# Patient Record
Sex: FEMALE | Race: WHITE
[De-identification: names, ages, dates, MRNs, and addresses within clinical notes are randomized per-mention and may not be internally consistent; named-entity substitution may affect disease eponyms.]

---

## 2021-06-09 ENCOUNTER — HOSPITAL ENCOUNTER (EMERGENCY)
Dept: HOSPITAL 96 - M.ERS | Age: 56
Discharge: LEFT BEFORE DISCHARGE | End: 2021-06-09
Payer: COMMERCIAL

## 2021-06-09 VITALS — BODY MASS INDEX: 22.5 KG/M2 | WEIGHT: 139.99 LBS | HEIGHT: 66 IN

## 2021-06-09 VITALS — SYSTOLIC BLOOD PRESSURE: 132 MMHG | DIASTOLIC BLOOD PRESSURE: 78 MMHG

## 2021-06-09 DIAGNOSIS — Z79.899: ICD-10-CM

## 2021-06-09 DIAGNOSIS — M79.662: ICD-10-CM

## 2021-06-09 DIAGNOSIS — U07.1: Primary | ICD-10-CM

## 2021-06-09 NOTE — EKG
[place] [address]
Phone:  [phone]                     ELECTROCARDIOGRAM REPORT      
_______________________________________________________________________________
 
Name:         RUDY LAMAR                Room:                     [facility]#:    [id number]     Account #:     [id number]  
Admission:    21    Attend Phys:                     
Discharge:    21    Date of Birth: [birth year]  
Date of Service: 21 0032  Report #:      [id number]
        [id number]
_______________________________________________________________________________
THIS REPORT FOR:  //name//                      
 
                         [facility] ED
                                       
Test Date:    2021               Test Time:    00:32:37
Pat Name:     RUDY LAMAR             Department:   
Patient ID:   [id number]            Room:          
Gender:                               Technician:   SAMSON
:          1965               Requested By: Melvina Babin
Order Number: [id number] MD:   Cuate Frye
                                 Measurements
Intervals                              Axis          
Rate:         98                       P:            18
MN:           139                      QRS:          34
QRSD:         83                       T:            8
QT:           305                                    
QTc:          390                                    
                           Interpretive Statements
Sinus rhythm
Probable left atrial enlargement
No previous ECG available for comparison
Electronically Signed On 2021 14:14:49 CDT by Cuate Frye
[email or web address]
 
 
 
 
 
 
 
 
 
 
 
 
 
 
 
 
 
 
 
 
 
  <ELECTRONICALLY SIGNED>
                                           By: Cuate Frye MD, [facility]     
  21     1414
D: 21   _____________________________________
T: 21   Cuate Frye MD, [facility]       /EPI

## 2021-06-13 ENCOUNTER — HOSPITAL ENCOUNTER (INPATIENT)
Dept: HOSPITAL 96 - M.ERS | Age: 56
LOS: 5 days | Discharge: LEFT BEFORE DISCHARGE | DRG: 871 | End: 2021-06-18
Attending: FAMILY MEDICINE | Admitting: FAMILY MEDICINE
Payer: COMMERCIAL

## 2021-06-13 VITALS — BODY MASS INDEX: 25.43 KG/M2 | WEIGHT: 158.25 LBS | HEIGHT: 65.98 IN

## 2021-06-13 VITALS — SYSTOLIC BLOOD PRESSURE: 108 MMHG | DIASTOLIC BLOOD PRESSURE: 70 MMHG

## 2021-06-13 DIAGNOSIS — Z91.19: ICD-10-CM

## 2021-06-13 DIAGNOSIS — B95.62: ICD-10-CM

## 2021-06-13 DIAGNOSIS — U07.1: ICD-10-CM

## 2021-06-13 DIAGNOSIS — J80: ICD-10-CM

## 2021-06-13 DIAGNOSIS — E87.1: ICD-10-CM

## 2021-06-13 DIAGNOSIS — Z53.29: ICD-10-CM

## 2021-06-13 DIAGNOSIS — J12.82: ICD-10-CM

## 2021-06-13 DIAGNOSIS — A41.89: Primary | ICD-10-CM

## 2021-06-13 DIAGNOSIS — Z79.899: ICD-10-CM

## 2021-06-13 LAB
ABSOLUTE MONOCYTES: 0.2 THOU/UL (ref 0–1.2)
ALBUMIN SERPL-MCNC: 2.7 G/DL (ref 3.4–5)
ALP SERPL-CCNC: 98 U/L (ref 46–116)
ALT SERPL-CCNC: 56 U/L (ref 30–65)
ANION GAP SERPL CALC-SCNC: 6 MMOL/L (ref 7–16)
AST SERPL-CCNC: 59 U/L (ref 15–37)
BILIRUB SERPL-MCNC: 0.2 MG/DL
BUN SERPL-MCNC: 14 MG/DL (ref 7–18)
CALCIUM SERPL-MCNC: 8.6 MG/DL (ref 8.5–10.1)
CHLORIDE SERPL-SCNC: 96 MMOL/L (ref 98–107)
CO2 SERPL-SCNC: 30 MMOL/L (ref 21–32)
CREAT SERPL-MCNC: 0.9 MG/DL (ref 0.6–1.3)
GLUCOSE SERPL-MCNC: 189 MG/DL (ref 70–99)
GRANULOCYTES NFR BLD MANUAL: 89 %
HCT VFR BLD CALC: 39.4 % (ref 37–47)
HGB BLD-MCNC: 13.6 GM/DL (ref 12–15)
INR PPP: 1
LYMPHOCYTES # BLD: 0.3 THOU/UL (ref 0.8–5.3)
LYMPHOCYTES NFR BLD AUTO: 7 %
MAGNESIUM SERPL-MCNC: 2.2 MG/DL (ref 1.8–2.4)
MCH RBC QN AUTO: 31.2 PG (ref 26–34)
MCHC RBC AUTO-ENTMCNC: 34.6 G/DL (ref 28–37)
MCV RBC: 90.2 FL (ref 80–100)
MONOCYTES NFR BLD: 4 %
MPV: 10.1 FL. (ref 7.2–11.1)
NEUTROPHILS # BLD: 3.8 THOU/UL (ref 1.6–8.1)
NT-PRO BRAIN NAT PEPTIDE: 64 PG/ML (ref ?–300)
NUCLEATED RBCS: 0 /100WBC
PLATELET # BLD EST: ADEQUATE 10*3/UL
PLATELET COUNT*: 205 THOU/UL (ref 150–400)
POTASSIUM SERPL-SCNC: 3.8 MMOL/L (ref 3.5–5.1)
PROT SERPL-MCNC: 7.4 G/DL (ref 6.4–8.2)
PROTHROMBIN TIME: 10.8 SECONDS (ref 9.2–11.5)
RBC # BLD AUTO: 4.36 MIL/UL (ref 4.2–5)
RBC MORPH BLD: NORMAL
RDW-CV: 12.9 % (ref 10.5–14.5)
SODIUM SERPL-SCNC: 132 MMOL/L (ref 136–145)
WBC # BLD AUTO: 4.3 THOU/UL (ref 4–11)

## 2021-06-14 VITALS — SYSTOLIC BLOOD PRESSURE: 120 MMHG | DIASTOLIC BLOOD PRESSURE: 77 MMHG

## 2021-06-14 VITALS — DIASTOLIC BLOOD PRESSURE: 64 MMHG | SYSTOLIC BLOOD PRESSURE: 113 MMHG

## 2021-06-14 VITALS — SYSTOLIC BLOOD PRESSURE: 102 MMHG | DIASTOLIC BLOOD PRESSURE: 63 MMHG

## 2021-06-14 VITALS — SYSTOLIC BLOOD PRESSURE: 110 MMHG | DIASTOLIC BLOOD PRESSURE: 70 MMHG

## 2021-06-14 VITALS — DIASTOLIC BLOOD PRESSURE: 72 MMHG | SYSTOLIC BLOOD PRESSURE: 111 MMHG

## 2021-06-14 VITALS — SYSTOLIC BLOOD PRESSURE: 108 MMHG | DIASTOLIC BLOOD PRESSURE: 67 MMHG

## 2021-06-14 VITALS — SYSTOLIC BLOOD PRESSURE: 124 MMHG | DIASTOLIC BLOOD PRESSURE: 60 MMHG

## 2021-06-14 LAB
ANION GAP SERPL CALC-SCNC: 6 MMOL/L (ref 7–16)
BE: 4.7 MMOL/L
BILIRUB UR-MCNC: NEGATIVE MG/DL
BUN SERPL-MCNC: 16 MG/DL (ref 7–18)
CALCIUM SERPL-MCNC: 8.7 MG/DL (ref 8.5–10.1)
CHLORIDE SERPL-SCNC: 98 MMOL/L (ref 98–107)
CO2 SERPL-SCNC: 30 MMOL/L (ref 21–32)
COLOR UR: YELLOW
CREAT SERPL-MCNC: 0.6 MG/DL (ref 0.6–1.3)
GLUCOSE SERPL-MCNC: 181 MG/DL (ref 70–99)
HCT VFR BLD CALC: 40.7 % (ref 37–47)
HGB BLD-MCNC: 13.8 GM/DL (ref 12–15)
KETONES UR STRIP-MCNC: NEGATIVE MG/DL
MCH RBC QN AUTO: 30.9 PG (ref 26–34)
MCHC RBC AUTO-ENTMCNC: 33.9 G/DL (ref 28–37)
MCV RBC: 91.1 FL (ref 80–100)
MPV: 9.7 FL. (ref 7.2–11.1)
PCO2 BLD: 36.3 MMHG (ref 35–45)
PLATELET COUNT*: 207 THOU/UL (ref 150–400)
PO2 BLD: 95.5 MMHG (ref 75–100)
POTASSIUM SERPL-SCNC: 4.7 MMOL/L (ref 3.5–5.1)
PROT UR QL STRIP: NEGATIVE
RBC # BLD AUTO: 4.47 MIL/UL (ref 4.2–5)
RBC # UR STRIP: NEGATIVE /UL
RDW-CV: 13.1 % (ref 10.5–14.5)
SODIUM SERPL-SCNC: 134 MMOL/L (ref 136–145)
SP GR UR STRIP: 1.01 (ref 1–1.03)
URINE CLARITY: CLEAR
URINE GLUCOSE-RANDOM: (no result)
URINE LEUKOCYTES-REFLEX: NEGATIVE
URINE NITRITE-REFLEX: NEGATIVE
UROBILINOGEN UR STRIP-ACNC: 0.2 E.U./DL (ref 0.2–1)
WBC # BLD AUTO: 4.5 THOU/UL (ref 4–11)

## 2021-06-14 PROCEDURE — XW033E5 INTRODUCTION OF REMDESIVIR ANTI-INFECTIVE INTO PERIPHERAL VEIN, PERCUTANEOUS APPROACH, NEW TECHNOLOGY GROUP 5: ICD-10-PCS | Performed by: FAMILY MEDICINE

## 2021-06-14 PROCEDURE — 5A0945A ASSISTANCE WITH RESPIRATORY VENTILATION, 24-96 CONSECUTIVE HOURS, HIGH NASAL FLOW/VELOCITY: ICD-10-PCS | Performed by: FAMILY MEDICINE

## 2021-06-14 NOTE — EKG
[place] [address]
Phone:  [phone]                     ELECTROCARDIOGRAM REPORT      
_______________________________________________________________________________
 
Name:         RUDY LAMAR                  Room:          [place] [address]    ADM [place] 
.R.#:    [id number]     Account #:     [id number]  
Admission:    21    Attend Phys:   Kristy Powers MD 
Discharge:                Date of Birth: [birth year]  
Date of Service: 21  Report #:      [id number]
        [id number]
_______________________________________________________________________________
THIS REPORT FOR:  //name//                      
 
                         [facility] ED
                                       
Test Date:    2021               Test Time:    19:53:33
Pat Name:     RUDY LAMAR             Department:   
Patient ID:   [id number]            Room:         [facility]
Gender:       F                        Technician:   STUDENT
:          1965               Requested By: Sherin Morgan
Order Number: [id number] MD:   Griffin Maher
                                 Measurements
Intervals                              Axis          
Rate:         84                       P:            43
MT:           139                      QRS:          43
QRSD:         84                       T:            42
QT:           336                                    
QTc:          398                                    
                           Interpretive Statements
Sinus rhythm
LAE, consider biatrial enlargement
Compared to ECG 2021 00:32:37
No significant changes
Electronically Signed On 2021 11:34:22 CDT by Griffin Maher
[email or web address]
 
 
 
 
 
 
 
 
 
 
 
 
 
 
 
 
 
 
 
 
  <ELECTRONICALLY SIGNED>
                                           By: Griffin Maher MD, [facility]      
  21     1134
D: 21   _____________________________________
T: 21   Griffin Maher MD, [facility]        /EPI

## 2021-06-15 VITALS — SYSTOLIC BLOOD PRESSURE: 112 MMHG | DIASTOLIC BLOOD PRESSURE: 61 MMHG

## 2021-06-15 VITALS — SYSTOLIC BLOOD PRESSURE: 114 MMHG | DIASTOLIC BLOOD PRESSURE: 70 MMHG

## 2021-06-15 VITALS — SYSTOLIC BLOOD PRESSURE: 122 MMHG | DIASTOLIC BLOOD PRESSURE: 74 MMHG

## 2021-06-15 VITALS — SYSTOLIC BLOOD PRESSURE: 126 MMHG | DIASTOLIC BLOOD PRESSURE: 63 MMHG

## 2021-06-15 VITALS — SYSTOLIC BLOOD PRESSURE: 107 MMHG | DIASTOLIC BLOOD PRESSURE: 68 MMHG

## 2021-06-15 LAB
ABSOLUTE BASOPHILS: 0 THOU/UL (ref 0–0.2)
ABSOLUTE EOSINOPHILS: 0 THOU/UL (ref 0–0.7)
ABSOLUTE MONOCYTES: 0.3 THOU/UL (ref 0–1.2)
ANION GAP SERPL CALC-SCNC: 8 MMOL/L (ref 7–16)
BASOPHILS NFR BLD AUTO: 0.2 %
BUN SERPL-MCNC: 21 MG/DL (ref 7–18)
CALCIUM SERPL-MCNC: 8.9 MG/DL (ref 8.5–10.1)
CHLORIDE SERPL-SCNC: 102 MMOL/L (ref 98–107)
CO2 SERPL-SCNC: 28 MMOL/L (ref 21–32)
CREAT SERPL-MCNC: 0.8 MG/DL (ref 0.6–1.3)
EOSINOPHIL NFR BLD: 0 %
GLUCOSE SERPL-MCNC: 156 MG/DL (ref 70–99)
GRANULOCYTES NFR BLD MANUAL: 84.2 %
HCT VFR BLD CALC: 39.9 % (ref 37–47)
HGB BLD-MCNC: 13.6 GM/DL (ref 12–15)
LYMPHOCYTES # BLD: 0.3 THOU/UL (ref 0.8–5.3)
LYMPHOCYTES NFR BLD AUTO: 6.9 %
MCH RBC QN AUTO: 31 PG (ref 26–34)
MCHC RBC AUTO-ENTMCNC: 34 G/DL (ref 28–37)
MCV RBC: 91.2 FL (ref 80–100)
MONOCYTES NFR BLD: 8.7 %
MPV: 9.8 FL. (ref 7.2–11.1)
NEUTROPHILS # BLD: 3.3 THOU/UL (ref 1.6–8.1)
NUCLEATED RBCS: 0 /100WBC
PLATELET COUNT*: 242 THOU/UL (ref 150–400)
POTASSIUM SERPL-SCNC: 3.6 MMOL/L (ref 3.5–5.1)
RBC # BLD AUTO: 4.38 MIL/UL (ref 4.2–5)
RDW-CV: 13 % (ref 10.5–14.5)
SODIUM SERPL-SCNC: 138 MMOL/L (ref 136–145)
WBC # BLD AUTO: 4 THOU/UL (ref 4–11)

## 2021-06-15 PROCEDURE — 02HV33Z INSERTION OF INFUSION DEVICE INTO SUPERIOR VENA CAVA, PERCUTANEOUS APPROACH: ICD-10-PCS | Performed by: FAMILY MEDICINE

## 2021-06-15 NOTE — 2DMMODE
[place] [address]
Phone:  [phone] 2 D/M-MODE ECHOCARDIOGRAM     
_______________________________________________________________________________
 
Name:         RUDY LAMAR                Room:          [place] [address]    ADM IN 
YANN.R.#:    [id number]     Account #:     [id number]  
Admission:    21    Attend Phys:   Kristy Powers MD 
Discharge:                Date of Birth: [birth year]  
Date of Service: 06/15/21 0819  Report #:      [id number]
        [id number]
_______________________________________________________________________________
THIS REPORT FOR:
 
cc:  FAM - No family physician/PCP 
     FAM - No family physician/PCP 
     Griffin Maher MD [facility]        
                                                                       ~
 
--------------- APPROVED REPORT --------------
 
 
Study performed:  2021 15:49:36
 
EXAM: Comprehensive 2D, Doppler, and color-flow 
Echocardiogram 
Patient Location: In-Patient   
Room #:  [facility]     Status:  routine
 
      BSA:         1.81
HR: 79 bpm BP:          124/60 mmHg 
Rhythm: NSR     
 
Other Information 
Study Quality: Good
 
Indications
Congestive Heart Failure
 
2D Dimensions
IVSd:  8.25 (7-11mm) LVOT Diam:  19.23 (18-24mm) 
LVDd:  42.09 mm  
PWd:  8.96 (7-11mm) Ascending Ao:  30.78 (22-36mm)
LVDs:  28.29 (25-40mm) 
Aortic Root:  28.98 mm 
 
Volumes
Left Atrial Volume (Systole) 
    LA ESV Index:  16.70 mL/m2
 
Aortic Valve
AoV Peak Emory.:  1.31 m/s 
AO Peak Gr.:  6.84 mmHg  LVOT Max P.10 mmHg
AO Mean Gr.:  3.67 mmHg  LVOT Mean PG:  3.47 mmHg
    LVOT Max V:  1.33 m/s
AO V2 VTI:  24.00 cm  LVOT Mean V:  0.86 m/s
CEM (VTI):  2.93 cm2  LVOT V1 VTI:  24.26 cm
 
 
 
[place] [address]
Phone:  [phone]                     2 D/M-MODE ECHOCARDIOGRAM     
_______________________________________________________________________________
 
Name:         RUDY LAMAR                Room:          [address] [place] 
.R.#:    [id number]     Account #:     [id number]  
Admission:    21    Attend Phys:   Kristy Powers MD 
Discharge:                Date of Birth: [birth year]  
Date of Service: 06/15/21 0819  Report #:      [id number]
        [id number]
_______________________________________________________________________________
Mitral Valve
    E/A Ratio:  1.13
    MV Decel. Time:  229.99 ms
MV E Max Emory.:  0.77 m/s 
MV PHT:  66.70 ms  
MVA (PHT):  3.30 cm2  
 
TDI
E/Lateral E':  7.00 E/Medial E':  7.70
   Medial E' Emory.:  0.10 m/s
   Lateral E' Emory.:  0.11 m/s
 
Pulmonary Valve
PV Peak Emory.:  0.92 m/s PV Peak Gr.:  3.36 mmHg
 
Tricuspid Valve
    RAP Estimate:  5.00 mmHg
TR Peak Gr.:  25.60 mmHg RVSP:  30.00 mmHg
    PA Pressure:  30.00 mmHg
 
Left Ventricle
The left ventricle is normal size. There is normal LV segmental wall 
motion. There is normal left ventricular wall thickness. Left 
ventricular systolic function is normal. The left ventricular 
ejection fraction is within the normal range. LVEF is 60-65%. The 
left ventricular diastolic function is normal.
 
Right Ventricle
The right ventricle is normal size. The right ventricular systolic 
function is normal.
 
Atria
The left atrium size is normal. The right atrium size is 
normal.
 
Aortic Valve
The aortic valve is normal in structure. No aortic regurgitation is 
present. There is no aortic valvular stenosis.
 
Mitral Valve
The mitral valve is normal in structure. There is trace mitral valve 
regurgitation noted. No evidence of mitral valve stenosis.
 
Tricuspid Valve
The tricuspid valve is normal in structure. Mild tricuspid 
regurgitation. estimated pa pressure 35 mm Hg
 
 
[place] [address]
Phone:  [phone]                     2 D/M-MODE ECHOCARDIOGRAM     
_______________________________________________________________________________
 
Name:         RUDY LAMAR                Room:          [address] [place] 
.R.#:    [id number]     Account #:     [id number]  
Admission:    21    Attend Phys:   Kristy Powers MD 
Discharge:                Date of Birth: [birth year]  
Date of Service: 06/15/21 0819  Report #:      [id number]
        [id number]
_______________________________________________________________________________
 
Pulmonic Valve
The pulmonary valve is normal in structure. There is no pulmonic 
valvular regurgitation.
 
Great Vessels
The aortic root is normal in size. IVC is normal in size and 
collapses >50% with inspiration.
 
Pericardium
There is no pericardial effusion.
 
<Conclusion>
LVEF is 60-65%.
There is trace mitral valve regurgitation noted.
Mild tricuspid regurgitation.
estimated pa pressure 35 mm Hg
 
 
 
 
 
 
 
 
 
 
 
 
 
 
 
 
 
 
 
 
 
 
 
 
 
 
 
  <ELECTRONICALLY SIGNED>
                                           By: Griffin Maher MD, [facility]      
  06/15/21     0819
D: 06/15/21 0819   _____________________________________
T: 06/15/21 0819   Griffin Maher MD, [facility]        /INF

## 2021-06-15 NOTE — CON
[address]  [address]                    CONSULTATION                  
_______________________________________________________________________________
 
Name:       RUDY LAMAR                 Room:           [place] [address]    ADM [place]  
M.R.#:  [id number]      Account #:      [id number]  
Admission:  06/13/21     Attend Phys:    Kristy Powers MD    
Discharge:               Date of Birth:  [birth year]  
         Report #: [id number]
                                                                     [id number]
_______________________________________________________________________________
THIS REPORT FOR:  
 
cc:  FAM - No family physician/PCP 
     FAM - No family physician/PCP                                        
     Tommy Byrne MD                                                   ~
 
 
DOC #: [id number]
Tommy Byrne MD
DATE OF CONSULTATION: 06/14/2021
 
REQUESTING PHYSICIAN:  Brice Cervantes MD
 
INDICATION FOR CONSULTATION:  Acute hypoxemic respiratory failure secondary to 
COVID-19.
 
HISTORY OF PRESENT ILLNESS:  This is a 56-year-old female, past medical history 
includes a history of endocarditis, shortly after a childbirth.  The patient 
says that she had had some dental work done prior to this and is reported to 
have had a damaged heart valve, do not have previous records available.  Other 
than this, she does not have any other past medical history of a cardiac or 
respiratory disease.  She is a lifetime nonsmoker.
 
She came to the Emergency Room a few days ago, was tested positive for COVID-19 
and had left against medical advice.  Now came back here with worsening 
shortness of breath and is reported to be in respiratory distress on initial 
arrival.  The patient currently is on a heated high-flow nasal cannula.  She is 
on 100% FiO2 with 50 liters flow.  However, she has stabilized on this therapy 
and the O2 saturation has come up to the high 90s.  She does have a cough.  
There is not much sputum.  She does not have chest pain.  She does not describe 
increase in swelling of lower extremities or calf pain.  There are no fever or 
chills.  She answers to the negative for 12 questions for review of systems, 
except as mentioned above.
 
PAST MEDICAL HISTORY:  Endocarditis following dental work and childbirth many 
years ago.
 
SOCIAL HISTORY:  Occasional alcohol use.  No known history of smoking or illegal
drug use.
 
ALLERGIES:  No known drug allergies.
 
CURRENT MEDICATIONS:  List in [employer] reviewed.
 
HOME MEDICATIONS:  List in [employer] reviewed.
 
 
 
 
[place] [address]                    CONSULTATION                  
_______________________________________________________________________________
 
Name:       RUDY LAMAR                 Room:           [address] [place]  
[facility]#:  [id number]      Account #:      [id number]  
Admission:  06/13/21     Attend Phys:    Kristy Powers MD    
Discharge:               Date of Birth:  [birth year]  
         Report #: [id number]
                                                                     [id number]
_______________________________________________________________________________
 
 
FAMILY HISTORY:  There is no pertinent family history.
 
PHYSICAL EXAMINATION:
GENERAL:  She is alert, awake, and oriented.  She does appear to be 
significantly anxious.
VITAL SIGNS:  Has a pulse of 72 and a blood pressure of 124/60.  Last recorded 
O2 saturation is 92% where the respiratory therapist told me that she is 
rechecked and this is in the high 90s, around 98% now, but she remains at 100% 
FiO2, 55 liters flow.  Respiratory rate was elevated to the high 20s at the time
of my evaluation, the patient was anxious.  Afebrile with a temperature of 36.7.
HEENT:  Head is normocephalic and atraumatic.  Body mass index is 26.  Mucous 
membranes are moist.
NECK:  Does not show raised JVP asymmetry, mass, or lymph nodes.
CHEST:  Symmetrical expansion on inspection and palpation.  On auscultation, 
breath sounds are bilaterally equal.  I do not hear any added sounds.
HEART:  Regular.  There is no murmur.
ABDOMEN:  Soft and nontender.
LOWER EXTREMITIES:  Show no edema and no calf tenderness.
SKIN:  Dry and intact.
NEUROLOGIC:  Moves all extremities bilaterally equally and spontaneously with no
focal deficit identified.
 
LABORATORY DATA:  The patient's chest x-ray is reviewed and compared with a 
chest x-ray done yesterday as well as a few days ago in the Emergency Room.  In 
summary, there are worsening infiltrates.  There is also some atelectasis.  The 
patient's lab work is in [employer] and this is also reviewed.  Arterial blood 
gases are consistent with acute hypoxemic respiratory failure as well as a 
respiratory alkalosis.  Her COVID-19 screen on 06/09 was positive.
 
ASSESSMENT AND PLAN:
1.  Acute hypoxemic respiratory failure secondary to COVID-19.  We will continue
with heated high-flow nasal cannula.  We will try to decrease FiO2 as tolerated.
 Would recommend avoid supine sleep and recommend sleeping on sides; if 
possible, thrown out of bed to chair if able to.  We will add BiPAP while 
asleep.
2.  COVID-19:  We will continue with dexamethasone, I increased the dose.  We 
will continue with remdesivir as well.  We will add Actemra.  I discussed risks 
and benefits of convalescent plasma with the patient.  The patient decided not 
to proceed with receiving convalescent plasma.
3.  Pulmonary infiltrates.  Would also cover for secondary bacterial infection. 
She is on doxycycline.  We will continue and switch her over to oral.  We will 
add ceftriaxone.  If possible, we will try to obtain a sputum culture as well as
nasal swab for MRSA.
4.  History of endocarditis/possible valvular heart disease.  We will run her on
 
 
 
[address]  [address]                    CONSULTATION                  
_______________________________________________________________________________
 
Name:       RUDY LAMAR                 Room:           [address] [place]  
[facility].#:  [id number]      Account #:      [id number]  
Admission:  06/13/21     Attend Phys:    Kristy Powers MD    
Discharge:               Date of Birth:  [birth year]  
         Report #: [id number]
                                                                     [id number]
_______________________________________________________________________________
 
 
the drier side.  I ordered one dose of Lasix. If needed to maintain blood 
pressure then we will give her midodrine.  Echocardiogram is also already 
ordered.  We will await results.
5.  Deep venous thrombosis prophylaxis/evaluation for thromboembolic phenomena. 
We will await echo.  We will also do venous Dopplers.  The patient's oxygen 
needs are too high at this time to transport 
to CTA chest.  My suspicion is also not
high.  I will, however, give her intermediate dose Lovenox for prophylaxis. 
Obtaining a CTA chest down the line could be a possible consideration.
6.  Gastrointestinal prophylaxis, on Protonix.
7.  Clostridium difficile prophylaxis.  We will add a Lactinex.
 
The patient is critically ill at this time.
 
Total time spent providing critical care to this patient today exceeds 41 
minutes.
 
Tommy Byrne MD
AP/SIM/AMI
 
D: 06/14/2021 01:56 PM
T: 06/15/2021 01:19 AM
 
 
 
 
 
 
 
 
 
 
 
 
 
 
 
 
 
 
 
 
<ELECTRONICALLY SIGNED>
                                        By:  Tommy Byrne MD              
06/15/21     0835
D: 06/14/21 1256_______________________________________
T: 06/15/21 0019Araysa Byrne MD                 /nt

## 2021-06-16 VITALS — SYSTOLIC BLOOD PRESSURE: 99 MMHG | DIASTOLIC BLOOD PRESSURE: 74 MMHG

## 2021-06-16 VITALS — DIASTOLIC BLOOD PRESSURE: 87 MMHG | SYSTOLIC BLOOD PRESSURE: 139 MMHG

## 2021-06-16 VITALS — DIASTOLIC BLOOD PRESSURE: 66 MMHG | SYSTOLIC BLOOD PRESSURE: 122 MMHG

## 2021-06-16 VITALS — DIASTOLIC BLOOD PRESSURE: 72 MMHG | SYSTOLIC BLOOD PRESSURE: 142 MMHG

## 2021-06-16 VITALS — SYSTOLIC BLOOD PRESSURE: 109 MMHG | DIASTOLIC BLOOD PRESSURE: 73 MMHG

## 2021-06-16 VITALS — DIASTOLIC BLOOD PRESSURE: 75 MMHG | SYSTOLIC BLOOD PRESSURE: 133 MMHG

## 2021-06-16 VITALS — SYSTOLIC BLOOD PRESSURE: 125 MMHG | DIASTOLIC BLOOD PRESSURE: 72 MMHG

## 2021-06-16 VITALS — DIASTOLIC BLOOD PRESSURE: 69 MMHG | SYSTOLIC BLOOD PRESSURE: 106 MMHG

## 2021-06-16 VITALS — SYSTOLIC BLOOD PRESSURE: 105 MMHG | DIASTOLIC BLOOD PRESSURE: 60 MMHG

## 2021-06-16 VITALS — DIASTOLIC BLOOD PRESSURE: 113 MMHG | SYSTOLIC BLOOD PRESSURE: 146 MMHG

## 2021-06-16 VITALS — DIASTOLIC BLOOD PRESSURE: 69 MMHG | SYSTOLIC BLOOD PRESSURE: 107 MMHG

## 2021-06-16 VITALS — SYSTOLIC BLOOD PRESSURE: 120 MMHG | DIASTOLIC BLOOD PRESSURE: 62 MMHG

## 2021-06-16 VITALS — SYSTOLIC BLOOD PRESSURE: 172 MMHG | DIASTOLIC BLOOD PRESSURE: 128 MMHG

## 2021-06-16 VITALS — SYSTOLIC BLOOD PRESSURE: 104 MMHG | DIASTOLIC BLOOD PRESSURE: 58 MMHG

## 2021-06-16 VITALS — DIASTOLIC BLOOD PRESSURE: 65 MMHG | SYSTOLIC BLOOD PRESSURE: 116 MMHG

## 2021-06-16 VITALS — SYSTOLIC BLOOD PRESSURE: 106 MMHG | DIASTOLIC BLOOD PRESSURE: 69 MMHG

## 2021-06-16 LAB
ABSOLUTE BASOPHILS: 0 THOU/UL (ref 0–0.2)
ABSOLUTE EOSINOPHILS: 0 THOU/UL (ref 0–0.7)
ABSOLUTE MONOCYTES: 0.1 THOU/UL (ref 0–1.2)
ALBUMIN SERPL-MCNC: 2.3 G/DL (ref 3.4–5)
ALP SERPL-CCNC: 103 U/L (ref 46–116)
ALT SERPL-CCNC: 85 U/L (ref 30–65)
ANION GAP SERPL CALC-SCNC: 4 MMOL/L (ref 7–16)
AST SERPL-CCNC: 45 U/L (ref 15–37)
BASOPHILS NFR BLD AUTO: 0 %
BE: 5.3 MMOL/L
BILIRUB SERPL-MCNC: 0.2 MG/DL
BUN SERPL-MCNC: 18 MG/DL (ref 7–18)
CALCIUM SERPL-MCNC: 8.5 MG/DL (ref 8.5–10.1)
CHLORIDE SERPL-SCNC: 103 MMOL/L (ref 98–107)
CO2 SERPL-SCNC: 31 MMOL/L (ref 21–32)
CREAT SERPL-MCNC: 0.8 MG/DL (ref 0.6–1.3)
EOSINOPHIL NFR BLD: 0.2 %
GLUCOSE SERPL-MCNC: 168 MG/DL (ref 70–99)
GRANULOCYTES NFR BLD MANUAL: 94.1 %
HCT VFR BLD CALC: 38.3 % (ref 37–47)
HGB BLD-MCNC: 12.9 GM/DL (ref 12–15)
LYMPHOCYTES # BLD: 0.2 THOU/UL (ref 0.8–5.3)
LYMPHOCYTES NFR BLD AUTO: 3.5 %
MCH RBC QN AUTO: 30.8 PG (ref 26–34)
MCHC RBC AUTO-ENTMCNC: 33.7 G/DL (ref 28–37)
MCV RBC: 91.5 FL (ref 80–100)
MONOCYTES NFR BLD: 2.2 %
MPV: 9.9 FL. (ref 7.2–11.1)
NEUTROPHILS # BLD: 5.4 THOU/UL (ref 1.6–8.1)
NUCLEATED RBCS: 0 /100WBC
PCO2 BLD: 39.7 MMHG (ref 35–45)
PLATELET COUNT*: 263 THOU/UL (ref 150–400)
PO2 BLD: 37.9 MMHG (ref 75–100)
POTASSIUM SERPL-SCNC: 4.2 MMOL/L (ref 3.5–5.1)
PROT SERPL-MCNC: 6.3 G/DL (ref 6.4–8.2)
RBC # BLD AUTO: 4.18 MIL/UL (ref 4.2–5)
RDW-CV: 13 % (ref 10.5–14.5)
SODIUM SERPL-SCNC: 138 MMOL/L (ref 136–145)
WBC # BLD AUTO: 5.8 THOU/UL (ref 4–11)

## 2021-06-17 VITALS — SYSTOLIC BLOOD PRESSURE: 93 MMHG | DIASTOLIC BLOOD PRESSURE: 71 MMHG

## 2021-06-17 VITALS — DIASTOLIC BLOOD PRESSURE: 65 MMHG | SYSTOLIC BLOOD PRESSURE: 112 MMHG

## 2021-06-17 VITALS — DIASTOLIC BLOOD PRESSURE: 75 MMHG | SYSTOLIC BLOOD PRESSURE: 123 MMHG

## 2021-06-17 VITALS — SYSTOLIC BLOOD PRESSURE: 183 MMHG | DIASTOLIC BLOOD PRESSURE: 164 MMHG

## 2021-06-17 VITALS — SYSTOLIC BLOOD PRESSURE: 117 MMHG | DIASTOLIC BLOOD PRESSURE: 65 MMHG

## 2021-06-17 VITALS — DIASTOLIC BLOOD PRESSURE: 64 MMHG | SYSTOLIC BLOOD PRESSURE: 107 MMHG

## 2021-06-17 VITALS — DIASTOLIC BLOOD PRESSURE: 63 MMHG | SYSTOLIC BLOOD PRESSURE: 105 MMHG

## 2021-06-17 VITALS — DIASTOLIC BLOOD PRESSURE: 62 MMHG | SYSTOLIC BLOOD PRESSURE: 100 MMHG

## 2021-06-17 VITALS — SYSTOLIC BLOOD PRESSURE: 122 MMHG | DIASTOLIC BLOOD PRESSURE: 77 MMHG

## 2021-06-17 VITALS — SYSTOLIC BLOOD PRESSURE: 91 MMHG | DIASTOLIC BLOOD PRESSURE: 70 MMHG

## 2021-06-17 VITALS — SYSTOLIC BLOOD PRESSURE: 116 MMHG | DIASTOLIC BLOOD PRESSURE: 66 MMHG

## 2021-06-17 VITALS — SYSTOLIC BLOOD PRESSURE: 119 MMHG | DIASTOLIC BLOOD PRESSURE: 93 MMHG

## 2021-06-17 VITALS — SYSTOLIC BLOOD PRESSURE: 116 MMHG | DIASTOLIC BLOOD PRESSURE: 83 MMHG

## 2021-06-17 VITALS — DIASTOLIC BLOOD PRESSURE: 69 MMHG | SYSTOLIC BLOOD PRESSURE: 116 MMHG

## 2021-06-17 VITALS — SYSTOLIC BLOOD PRESSURE: 158 MMHG | DIASTOLIC BLOOD PRESSURE: 79 MMHG

## 2021-06-17 VITALS — DIASTOLIC BLOOD PRESSURE: 57 MMHG | SYSTOLIC BLOOD PRESSURE: 109 MMHG

## 2021-06-17 VITALS — DIASTOLIC BLOOD PRESSURE: 78 MMHG | SYSTOLIC BLOOD PRESSURE: 112 MMHG

## 2021-06-17 VITALS — DIASTOLIC BLOOD PRESSURE: 65 MMHG | SYSTOLIC BLOOD PRESSURE: 116 MMHG

## 2021-06-17 VITALS — DIASTOLIC BLOOD PRESSURE: 68 MMHG | SYSTOLIC BLOOD PRESSURE: 117 MMHG

## 2021-06-17 VITALS — DIASTOLIC BLOOD PRESSURE: 73 MMHG | SYSTOLIC BLOOD PRESSURE: 117 MMHG

## 2021-06-17 VITALS — DIASTOLIC BLOOD PRESSURE: 66 MMHG | SYSTOLIC BLOOD PRESSURE: 107 MMHG

## 2021-06-17 VITALS — SYSTOLIC BLOOD PRESSURE: 94 MMHG | DIASTOLIC BLOOD PRESSURE: 64 MMHG

## 2021-06-17 VITALS — DIASTOLIC BLOOD PRESSURE: 78 MMHG | SYSTOLIC BLOOD PRESSURE: 125 MMHG

## 2021-06-17 VITALS — DIASTOLIC BLOOD PRESSURE: 94 MMHG | SYSTOLIC BLOOD PRESSURE: 124 MMHG

## 2021-06-17 VITALS — DIASTOLIC BLOOD PRESSURE: 136 MMHG | SYSTOLIC BLOOD PRESSURE: 162 MMHG

## 2021-06-17 VITALS — DIASTOLIC BLOOD PRESSURE: 90 MMHG | SYSTOLIC BLOOD PRESSURE: 158 MMHG

## 2021-06-17 VITALS — DIASTOLIC BLOOD PRESSURE: 122 MMHG | SYSTOLIC BLOOD PRESSURE: 145 MMHG

## 2021-06-17 VITALS — DIASTOLIC BLOOD PRESSURE: 70 MMHG | SYSTOLIC BLOOD PRESSURE: 115 MMHG

## 2021-06-17 VITALS — SYSTOLIC BLOOD PRESSURE: 120 MMHG | DIASTOLIC BLOOD PRESSURE: 65 MMHG

## 2021-06-17 VITALS — DIASTOLIC BLOOD PRESSURE: 69 MMHG | SYSTOLIC BLOOD PRESSURE: 143 MMHG

## 2021-06-17 VITALS — SYSTOLIC BLOOD PRESSURE: 107 MMHG | DIASTOLIC BLOOD PRESSURE: 67 MMHG

## 2021-06-17 VITALS — SYSTOLIC BLOOD PRESSURE: 105 MMHG | DIASTOLIC BLOOD PRESSURE: 66 MMHG

## 2021-06-17 VITALS — DIASTOLIC BLOOD PRESSURE: 71 MMHG | SYSTOLIC BLOOD PRESSURE: 121 MMHG

## 2021-06-17 VITALS — SYSTOLIC BLOOD PRESSURE: 112 MMHG | DIASTOLIC BLOOD PRESSURE: 85 MMHG

## 2021-06-17 VITALS — DIASTOLIC BLOOD PRESSURE: 71 MMHG | SYSTOLIC BLOOD PRESSURE: 104 MMHG

## 2021-06-17 VITALS — SYSTOLIC BLOOD PRESSURE: 119 MMHG | DIASTOLIC BLOOD PRESSURE: 77 MMHG

## 2021-06-17 VITALS — DIASTOLIC BLOOD PRESSURE: 48 MMHG | SYSTOLIC BLOOD PRESSURE: 88 MMHG

## 2021-06-17 VITALS — DIASTOLIC BLOOD PRESSURE: 83 MMHG | SYSTOLIC BLOOD PRESSURE: 139 MMHG

## 2021-06-17 LAB
ABSOLUTE EOSINOPHILS: 0.1 THOU/UL (ref 0–0.7)
ABSOLUTE MONOCYTES: 0.2 THOU/UL (ref 0–1.2)
ALBUMIN SERPL-MCNC: 2.5 G/DL (ref 3.4–5)
ALP SERPL-CCNC: 100 U/L (ref 46–116)
ALT SERPL-CCNC: 83 U/L (ref 30–65)
ANION GAP SERPL CALC-SCNC: 5 MMOL/L (ref 7–16)
ANION GAP SERPL CALC-SCNC: 5 MMOL/L (ref 7–16)
ANISOCYTOSIS BLD QL SMEAR: (no result)
AST SERPL-CCNC: 38 U/L (ref 15–37)
BILIRUB SERPL-MCNC: 0.2 MG/DL
BUN SERPL-MCNC: 18 MG/DL (ref 7–18)
BUN SERPL-MCNC: 19 MG/DL (ref 7–18)
CALCIUM SERPL-MCNC: 8.4 MG/DL (ref 8.5–10.1)
CALCIUM SERPL-MCNC: 8.6 MG/DL (ref 8.5–10.1)
CHLORIDE SERPL-SCNC: 100 MMOL/L (ref 98–107)
CHLORIDE SERPL-SCNC: 102 MMOL/L (ref 98–107)
CO2 SERPL-SCNC: 32 MMOL/L (ref 21–32)
CO2 SERPL-SCNC: 33 MMOL/L (ref 21–32)
CREAT SERPL-MCNC: 0.7 MG/DL (ref 0.6–1.3)
CREAT SERPL-MCNC: 0.9 MG/DL (ref 0.6–1.3)
EOSINOPHIL NFR BLD: 1 %
GLUCOSE SERPL-MCNC: 125 MG/DL (ref 70–99)
GLUCOSE SERPL-MCNC: 219 MG/DL (ref 70–99)
GRANULOCYTES NFR BLD MANUAL: 90 %
HCT VFR BLD CALC: 39.8 % (ref 37–47)
HGB BLD-MCNC: 13.5 GM/DL (ref 12–15)
LYMPHOCYTES # BLD: 0.4 THOU/UL (ref 0.8–5.3)
LYMPHOCYTES NFR BLD AUTO: 6 %
MAGNESIUM SERPL-MCNC: 2.1 MG/DL (ref 1.8–2.4)
MCH RBC QN AUTO: 30.9 PG (ref 26–34)
MCHC RBC AUTO-ENTMCNC: 34 G/DL (ref 28–37)
MCV RBC: 90.9 FL (ref 80–100)
MONOCYTES NFR BLD: 3 %
MPV: 9.3 FL. (ref 7.2–11.1)
NEUTROPHILS # BLD: 6.5 THOU/UL (ref 1.6–8.1)
NUCLEATED RBCS: 0 /100WBC
PLATELET # BLD EST: ADEQUATE 10*3/UL
PLATELET COUNT*: 313 THOU/UL (ref 150–400)
POIKILOCYTOSIS BLD QL SMEAR: (no result)
POTASSIUM SERPL-SCNC: 3.6 MMOL/L (ref 3.5–5.1)
POTASSIUM SERPL-SCNC: 4.2 MMOL/L (ref 3.5–5.1)
PREALB SERPL-MCNC: 21 MG/DL (ref 18–35.7)
PROT SERPL-MCNC: 6.5 G/DL (ref 6.4–8.2)
RBC # BLD AUTO: 4.38 MIL/UL (ref 4.2–5)
RDW-CV: 13 % (ref 10.5–14.5)
SODIUM SERPL-SCNC: 137 MMOL/L (ref 136–145)
SODIUM SERPL-SCNC: 140 MMOL/L (ref 136–145)
WBC # BLD AUTO: 7.2 THOU/UL (ref 4–11)

## 2021-06-17 PROCEDURE — 5A09357 ASSISTANCE WITH RESPIRATORY VENTILATION, LESS THAN 24 CONSECUTIVE HOURS, CONTINUOUS POSITIVE AIRWAY PRESSURE: ICD-10-PCS | Performed by: FAMILY MEDICINE

## 2021-06-17 PROCEDURE — 5A0935A ASSISTANCE WITH RESPIRATORY VENTILATION, LESS THAN 24 CONSECUTIVE HOURS, HIGH NASAL FLOW/VELOCITY: ICD-10-PCS | Performed by: FAMILY MEDICINE

## 2021-06-18 VITALS — DIASTOLIC BLOOD PRESSURE: 71 MMHG | SYSTOLIC BLOOD PRESSURE: 93 MMHG
